# Patient Record
Sex: MALE | Race: WHITE | Employment: FULL TIME
[De-identification: names, ages, dates, MRNs, and addresses within clinical notes are randomized per-mention and may not be internally consistent; named-entity substitution may affect disease eponyms.]

---

## 2024-11-15 ENCOUNTER — HOSPITAL ENCOUNTER (EMERGENCY)
Facility: HOSPITAL | Age: 54
Discharge: HOME | End: 2024-11-15
Attending: EMERGENCY MEDICINE
Payer: COMMERCIAL

## 2024-11-15 VITALS
DIASTOLIC BLOOD PRESSURE: 98 MMHG | WEIGHT: 175.27 LBS | HEART RATE: 77 BPM | RESPIRATION RATE: 26 BRPM | TEMPERATURE: 98.7 F | SYSTOLIC BLOOD PRESSURE: 139 MMHG | BODY MASS INDEX: 27.51 KG/M2 | HEIGHT: 67 IN | OXYGEN SATURATION: 95 %

## 2024-11-15 DIAGNOSIS — F10.930 ALCOHOL WITHDRAWAL SYNDROME WITHOUT COMPLICATION (HCC): Primary | ICD-10-CM

## 2024-11-15 LAB
ALBUMIN SERPL-MCNC: 3.8 G/DL (ref 3.5–5)
ALBUMIN/GLOB SERPL: 0.9 (ref 1.1–2.2)
ALP SERPL-CCNC: 107 U/L (ref 45–117)
ALT SERPL-CCNC: 79 U/L (ref 12–78)
ANION GAP SERPL CALC-SCNC: 13 MMOL/L (ref 2–12)
AST SERPL-CCNC: 87 U/L (ref 15–37)
BASOPHILS # BLD: 0.1 K/UL (ref 0–0.1)
BASOPHILS NFR BLD: 2 % (ref 0–1)
BILIRUB SERPL-MCNC: 1.2 MG/DL (ref 0.2–1)
BUN SERPL-MCNC: 9 MG/DL (ref 6–20)
BUN/CREAT SERPL: 8 (ref 12–20)
CALCIUM SERPL-MCNC: 8.5 MG/DL (ref 8.5–10.1)
CHLORIDE SERPL-SCNC: 97 MMOL/L (ref 97–108)
CO2 SERPL-SCNC: 27 MMOL/L (ref 21–32)
CREAT SERPL-MCNC: 1.1 MG/DL (ref 0.7–1.3)
DIFFERENTIAL METHOD BLD: ABNORMAL
EOSINOPHIL # BLD: 0.2 K/UL (ref 0–0.4)
EOSINOPHIL NFR BLD: 3 % (ref 0–7)
ERYTHROCYTE [DISTWIDTH] IN BLOOD BY AUTOMATED COUNT: 14.5 % (ref 11.5–14.5)
ETHANOL SERPL-MCNC: <10 MG/DL (ref 0–0.08)
GLOBULIN SER CALC-MCNC: 4.2 G/DL (ref 2–4)
GLUCOSE SERPL-MCNC: 124 MG/DL (ref 65–100)
HCT VFR BLD AUTO: 45.1 % (ref 36.6–50.3)
HGB BLD-MCNC: 15.7 G/DL (ref 12.1–17)
IMM GRANULOCYTES # BLD AUTO: 0 K/UL (ref 0–0.04)
IMM GRANULOCYTES NFR BLD AUTO: 0 % (ref 0–0.5)
INR PPP: 1.2 (ref 0.9–1.1)
LIPASE SERPL-CCNC: 28 U/L (ref 13–75)
LYMPHOCYTES # BLD: 1.2 K/UL (ref 0.8–3.5)
LYMPHOCYTES NFR BLD: 22 % (ref 12–49)
MAGNESIUM SERPL-MCNC: 1.3 MG/DL (ref 1.6–2.4)
MCH RBC QN AUTO: 32.8 PG (ref 26–34)
MCHC RBC AUTO-ENTMCNC: 34.8 G/DL (ref 30–36.5)
MCV RBC AUTO: 94.2 FL (ref 80–99)
MONOCYTES # BLD: 0.7 K/UL (ref 0–1)
MONOCYTES NFR BLD: 12 % (ref 5–13)
NEUTS SEG # BLD: 3.3 K/UL (ref 1.8–8)
NEUTS SEG NFR BLD: 61 % (ref 32–75)
NRBC # BLD: 0 K/UL (ref 0–0.01)
NRBC BLD-RTO: 0 PER 100 WBC
PLATELET # BLD AUTO: 148 K/UL (ref 150–400)
PMV BLD AUTO: 9.1 FL (ref 8.9–12.9)
POTASSIUM SERPL-SCNC: 3.2 MMOL/L (ref 3.5–5.1)
PROT SERPL-MCNC: 8 G/DL (ref 6.4–8.2)
PROTHROMBIN TIME: 12.2 SEC (ref 9–11.1)
RBC # BLD AUTO: 4.79 M/UL (ref 4.1–5.7)
SODIUM SERPL-SCNC: 137 MMOL/L (ref 136–145)
TROPONIN I SERPL HS-MCNC: 11 NG/L (ref 0–76)
WBC # BLD AUTO: 5.5 K/UL (ref 4.1–11.1)

## 2024-11-15 PROCEDURE — 83690 ASSAY OF LIPASE: CPT

## 2024-11-15 PROCEDURE — 6370000000 HC RX 637 (ALT 250 FOR IP): Performed by: EMERGENCY MEDICINE

## 2024-11-15 PROCEDURE — 80053 COMPREHEN METABOLIC PANEL: CPT

## 2024-11-15 PROCEDURE — 99284 EMERGENCY DEPT VISIT MOD MDM: CPT

## 2024-11-15 PROCEDURE — 36415 COLL VENOUS BLD VENIPUNCTURE: CPT

## 2024-11-15 PROCEDURE — 83735 ASSAY OF MAGNESIUM: CPT

## 2024-11-15 PROCEDURE — 85610 PROTHROMBIN TIME: CPT

## 2024-11-15 PROCEDURE — 82077 ASSAY SPEC XCP UR&BREATH IA: CPT

## 2024-11-15 PROCEDURE — 84484 ASSAY OF TROPONIN QUANT: CPT

## 2024-11-15 PROCEDURE — 85025 COMPLETE CBC W/AUTO DIFF WBC: CPT

## 2024-11-15 RX ORDER — ONDANSETRON 4 MG/1
4 TABLET, ORALLY DISINTEGRATING ORAL 3 TIMES DAILY PRN
Qty: 21 TABLET | Refills: 0 | Status: SHIPPED | OUTPATIENT
Start: 2024-11-15

## 2024-11-15 RX ORDER — CHLORDIAZEPOXIDE HYDROCHLORIDE 10 MG/1
20 CAPSULE, GELATIN COATED ORAL
Status: COMPLETED | OUTPATIENT
Start: 2024-11-15 | End: 2024-11-15

## 2024-11-15 RX ORDER — CHLORDIAZEPOXIDE HYDROCHLORIDE 10 MG/1
10 CAPSULE, GELATIN COATED ORAL 3 TIMES DAILY PRN
Qty: 12 CAPSULE | Refills: 0 | Status: SHIPPED | OUTPATIENT
Start: 2024-11-15 | End: 2024-11-19

## 2024-11-15 RX ORDER — CHLORDIAZEPOXIDE HYDROCHLORIDE 25 MG/1
25 CAPSULE, GELATIN COATED ORAL
Status: DISCONTINUED | OUTPATIENT
Start: 2024-11-15 | End: 2024-11-15

## 2024-11-15 RX ORDER — NALTREXONE HYDROCHLORIDE 50 MG/1
25 TABLET, FILM COATED ORAL DAILY
COMMUNITY

## 2024-11-15 RX ADMIN — CHLORDIAZEPOXIDE HYDROCHLORIDE 20 MG: 10 CAPSULE ORAL at 14:53

## 2024-11-15 ASSESSMENT — PAIN - FUNCTIONAL ASSESSMENT: PAIN_FUNCTIONAL_ASSESSMENT: NONE - DENIES PAIN

## 2024-11-15 NOTE — ED NOTES
Change of shift. Care of patient taken over from Dr. Rondon; H&P reviewed, handoff complete.    Patient here for alcohol withdrawals, was given Librium CIWA of 1.  Stable for discharge home was given prescription for Librium.  Will follow-up outpatient for rehab.  Stable for discharge home outpatient follow-up.      LABORATORY TESTS:  Recent Results (from the past 12 hour(s))   CBC with Auto Differential    Collection Time: 11/15/24  2:11 PM   Result Value Ref Range    WBC 5.5 4.1 - 11.1 K/uL    RBC 4.79 4.10 - 5.70 M/uL    Hemoglobin 15.7 12.1 - 17.0 g/dL    Hematocrit 45.1 36.6 - 50.3 %    MCV 94.2 80.0 - 99.0 FL    MCH 32.8 26.0 - 34.0 PG    MCHC 34.8 30.0 - 36.5 g/dL    RDW 14.5 11.5 - 14.5 %    Platelets 148 (L) 150 - 400 K/uL    MPV 9.1 8.9 - 12.9 FL    Nucleated RBCs 0.0 0  WBC    nRBC 0.00 0.00 - 0.01 K/uL    Neutrophils % 61 32 - 75 %    Lymphocytes % 22 12 - 49 %    Monocytes % 12 5 - 13 %    Eosinophils % 3 0 - 7 %    Basophils % 2 (H) 0 - 1 %    Immature Granulocytes % 0 0.0 - 0.5 %    Neutrophils Absolute 3.3 1.8 - 8.0 K/UL    Lymphocytes Absolute 1.2 0.8 - 3.5 K/UL    Monocytes Absolute 0.7 0.0 - 1.0 K/UL    Eosinophils Absolute 0.2 0.0 - 0.4 K/UL    Basophils Absolute 0.1 0.0 - 0.1 K/UL    Immature Granulocytes Absolute 0.0 0.00 - 0.04 K/UL    Differential Type AUTOMATED     Comprehensive Metabolic Panel    Collection Time: 11/15/24  2:11 PM   Result Value Ref Range    Sodium 137 136 - 145 mmol/L    Potassium 3.2 (L) 3.5 - 5.1 mmol/L    Chloride 97 97 - 108 mmol/L    CO2 27 21 - 32 mmol/L    Anion Gap 13 (H) 2 - 12 mmol/L    Glucose 124 (H) 65 - 100 mg/dL    BUN 9 6 - 20 MG/DL    Creatinine 1.10 0.70 - 1.30 MG/DL    BUN/Creatinine Ratio 8 (L) 12 - 20      Est, Glom Filt Rate 80 >60 ml/min/1.73m2    Calcium 8.5 8.5 - 10.1 MG/DL    Total Bilirubin 1.2 (H) 0.2 - 1.0 MG/DL    ALT 79 (H) 12 - 78 U/L    AST 87 (H) 15 - 37 U/L    Alk Phosphatase 107 45 - 117 U/L    Total Protein 8.0 6.4 - 8.2 g/dL

## 2024-11-15 NOTE — ED TRIAGE NOTES
Patient presents ambulatory to triage, steady gait. Pt states, \"I'm an alcoholic I finally admitted that to myself. I drank Monday night, Tuesday night, Wednesday night, Thursday night and I just want to end it\". \"I am feeling really rough, shaky, nausea, all the fun stuff.\" Last drink 2100 yesterday. Reports hx of withdraw \"once or twice\". Reports being prescribed Librium in the past. Patient states the other time \"I think they gave me barbiturates and Librium that was titrated.\" \"I am not a padma who goes around trying to seek out pills, I need some help, I am going to try and take some time off work and go to rehab.\" Denies Dts and denies seizures. Denies SI/HI. Endorses overall depression.

## 2024-11-15 NOTE — ED PROVIDER NOTES
[facility]  EMERGENCY DEPARTMENT ENCOUNTER      Pt Name: Magdi Rain  MRN: [id number]  Birthdate 1970  Date of evaluation: 11/15/2024  Provider: Anish Rondon DO      HISTORY OF PRESENT ILLNESS      HPI  54-year-old male with a history of EtOH abuse presents to the emergency department stating that he has been on a ross drinking heavily for the last 4 nights and he just wants to quit.  He states that he is pending rehab 2 times previously and had improvement in his symptoms with Librium and similar medications.  He denies any SI, HI and no history of DTs or seizures.  He states that he is feeling shaky, anxious, intermittently nauseous similar to prior episodes of withdrawal.  He states that he is planning to take work off next week so that he can follow-up and get rehab.  He is very motivated to do so.      Nursing Notes were reviewed.    REVIEW OF SYSTEMS         Review of Systems        PAST MEDICAL HISTORY   History reviewed. No pertinent past medical history.      SURGICAL HISTORY     History reviewed. No pertinent surgical history.      CURRENT MEDICATIONS       Previous Medications    OMEPRAZOLE (PRILOSEC) 20 MG DELAYED RELEASE CAPSULE    Take 1 capsule by mouth daily       ALLERGIES     Levaquin [levofloxacin]    FAMILY HISTORY     History reviewed. No pertinent family history.       SOCIAL HISTORY       Social History     Tobacco Use    Smoking status: Former     Types: Cigarettes    Smokeless tobacco: Never   Substance and Sexual Activity    Alcohol use: Yes     Comment: \"typically I buy these airplane bottles, 6-8 a night\"    Drug use: Never     Social Determinants of Health     Food Insecurity: Patient Declined (10/22/2024)    Received from [facility]    Hunger Vital Sign     Worried About Running Out of Food in the Last Year: Patient declined     Ran Out of Food in the Last Year: Patient declined   Transportation Needs: Patient Declined (10/22/2024)

## 2025-06-09 ENCOUNTER — HOSPITAL ENCOUNTER (EMERGENCY)
Age: 55
Discharge: HOME | End: 2025-06-09
Payer: COMMERCIAL

## 2025-06-09 VITALS
DIASTOLIC BLOOD PRESSURE: 90 MMHG | HEART RATE: 90 BPM | TEMPERATURE: 98 F | WEIGHT: 175 LBS | RESPIRATION RATE: 18 BRPM | HEIGHT: 67 IN | SYSTOLIC BLOOD PRESSURE: 152 MMHG | BODY MASS INDEX: 27.47 KG/M2 | OXYGEN SATURATION: 95 %

## 2025-06-09 DIAGNOSIS — F10.230 ALCOHOL DEPENDENCE WITH UNCOMPLICATED WITHDRAWAL (HCC): Primary | ICD-10-CM

## 2025-06-09 PROCEDURE — 99283 EMERGENCY DEPT VISIT LOW MDM: CPT

## 2025-06-09 RX ORDER — CHLORDIAZEPOXIDE HYDROCHLORIDE 25 MG/1
CAPSULE, GELATIN COATED ORAL
Qty: 15 CAPSULE | Refills: 0 | Status: SHIPPED | OUTPATIENT
Start: 2025-06-09 | End: 2025-06-11

## 2025-06-09 ASSESSMENT — PAIN - FUNCTIONAL ASSESSMENT: PAIN_FUNCTIONAL_ASSESSMENT: NONE - DENIES PAIN

## 2025-06-09 ASSESSMENT — LIFESTYLE VARIABLES
HOW MANY STANDARD DRINKS CONTAINING ALCOHOL DO YOU HAVE ON A TYPICAL DAY: 5 OR 6
HOW OFTEN DO YOU HAVE A DRINK CONTAINING ALCOHOL: 4 OR MORE TIMES A WEEK

## 2025-06-09 NOTE — ED TRIAGE NOTES
Pt to ER c/o alcohol withdrawal. Pt endorses drinking steadily since last week.    Pt states his sister past away last week from fall while intoxicated.    Pt has had inpt treatment previously but out of state in [place].

## 2025-06-09 NOTE — ED PROVIDER NOTES
every 6 hours on day 1. Take 25 mg every 6 hours on day 2. Take 25 mg every 12 hours on day 3. Take 25 mg tablet before bedtime on day 4.     Dispense:  15 capsule     Refill:  0     Patient attempting to dc Ativan rxd currently for anxiety. Has been binge drinking and requesting Librium taper to avoid Ativan use. Please call [facility] if any issues [phone]          Medication List        START taking these medications      chlordiazePOXIDE 25 MG capsule  Commonly known as: LIBRIUM  Take 50 mg every 6 hours on day 1. Take 25 mg every 6 hours on day 2. Take 25 mg every 12 hours on day 3. Take 25 mg tablet before bedtime on day 4.            ASK your doctor about these medications      LUNESTA PO     naltrexone 50 MG tablet  Commonly known as: DEPADE     omeprazole 20 MG delayed release capsule  Commonly known as: PRILOSEC     ondansetron 4 MG disintegrating tablet  Commonly known as: ZOFRAN-ODT  Take 1 tablet by mouth 3 times daily as needed for Nausea or Vomiting               Where to Get Your Medications        These medications were sent to [id number] - [place], [place] - [address] -  [phone] - F [phone]  [facility] access [facility] [facility] [address]      Phone: [phone]   chlordiazePOXIDE 25 MG capsule                     Danay, Carlos Alberto DAVLIA PA-C  06/10/25 0595

## 2025-06-09 NOTE — DISCHARGE INSTRUCTIONS
Take your naltrexone as prescribed, keep your AA sponsor up-to-date with ED disposition today, do not take Ativan while taking Librium.  Return to nearest emergency department or call ambulance if withdrawal symptoms do not improve or worsen while taking Librium

## 2025-06-10 ASSESSMENT — ENCOUNTER SYMPTOMS
VOMITING: 0
DIARRHEA: 0
SHORTNESS OF BREATH: 0